# Patient Record
Sex: FEMALE | Race: BLACK OR AFRICAN AMERICAN | NOT HISPANIC OR LATINO | Employment: UNEMPLOYED | URBAN - METROPOLITAN AREA
[De-identification: names, ages, dates, MRNs, and addresses within clinical notes are randomized per-mention and may not be internally consistent; named-entity substitution may affect disease eponyms.]

---

## 2022-12-04 ENCOUNTER — OFFICE VISIT (OUTPATIENT)
Dept: URGENT CARE | Facility: CLINIC | Age: 22
End: 2022-12-04
Payer: COMMERCIAL

## 2022-12-04 VITALS
HEART RATE: 91 BPM | DIASTOLIC BLOOD PRESSURE: 88 MMHG | SYSTOLIC BLOOD PRESSURE: 148 MMHG | BODY MASS INDEX: 47.09 KG/M2 | OXYGEN SATURATION: 100 % | WEIGHT: 293 LBS | TEMPERATURE: 98 F | HEIGHT: 66 IN | RESPIRATION RATE: 18 BRPM

## 2022-12-04 DIAGNOSIS — S83.411A SPRAIN OF MEDIAL COLLATERAL LIGAMENT OF RIGHT KNEE, INITIAL ENCOUNTER: Primary | ICD-10-CM

## 2022-12-04 DIAGNOSIS — M25.569 KNEE PAIN, UNSPECIFIED CHRONICITY, UNSPECIFIED LATERALITY: ICD-10-CM

## 2022-12-04 PROCEDURE — 99203 PR OFFICE/OUTPT VISIT, NEW, LEVL III, 30-44 MIN: ICD-10-PCS | Mod: S$GLB,,,

## 2022-12-04 PROCEDURE — 73564 X-RAY EXAM KNEE 4 OR MORE: CPT | Mod: RT,S$GLB,, | Performed by: RADIOLOGY

## 2022-12-04 PROCEDURE — 99203 OFFICE O/P NEW LOW 30 MIN: CPT | Mod: S$GLB,,,

## 2022-12-04 PROCEDURE — 73564 XR KNEE COMP 4 OR MORE VIEWS RIGHT: ICD-10-PCS | Mod: RT,S$GLB,, | Performed by: RADIOLOGY

## 2022-12-04 RX ORDER — NAPROXEN 500 MG/1
500 TABLET ORAL 2 TIMES DAILY
Qty: 14 TABLET | Refills: 0 | Status: SHIPPED | OUTPATIENT
Start: 2022-12-04 | End: 2022-12-04

## 2022-12-04 RX ORDER — NAPROXEN 500 MG/1
500 TABLET ORAL 2 TIMES DAILY
Qty: 20 TABLET | Refills: 0 | Status: SHIPPED | OUTPATIENT
Start: 2022-12-04 | End: 2022-12-14

## 2022-12-04 NOTE — PATIENT INSTRUCTIONS
- Rest.    - Keep ace wrap on the knee at all times until pain has resolved.   - Rest the knee for the first 2 days and then slowly try to return to normal activity.     A referral for Orthopedics has been placed. Call [phone] to schedule an appointment.       - You must understand that you have received an Urgent Care treatment only and that you may be released before all of your medical problems are known or treated.   - You, the patient, will arrange for follow up care as instructed.   - If your condition worsens or fails to improve we recommend that you receive another evaluation at the ER immediately or contact your PCP to discuss your concerns or return here.   - Follow up with your PCP or specialty clinic as directed in the next 1-2 weeks if not improved or as needed.  You can call [phone] to schedule an appointment with the appropriate provider.    If your symptoms do not improve or worsen, go to the emergency room immediately.

## 2022-12-04 NOTE — PROGRESS NOTES
"Subjective:       Patient ID: Sneha Porter is a 22 y.o. female.    Vitals:  height is 5' 6" (1.676 m) and weight is 136.1 kg (300 lb). Her temperature is 98.2 °F (36.8 °C). Her blood pressure is 148/88 (abnormal) and her pulse is 91. Her respiration is 18 and oxygen saturation is 100%.     Chief Complaint: Knee Pain    Patient reports falling last night on a wood floor and felt a pop yesterday. She is unsure what caused her to fall. Patient can weight bear but it is extremely painful 8/10. Patient reports a stabbing pain when extending the leg or standing. No previous injury or surgery to the knee. Patient did take a naproxen with mild relief. She put ice on it last night. Denies tingling or numbness.     Knee Pain   The incident occurred 6 to 12 hours ago. The incident occurred in the street. The injury mechanism was a fall. The pain is present in the right knee. The pain is at a severity of 8/10. The pain is moderate. The pain has been Fluctuating since onset. Pertinent negatives include no inability to bear weight, numbness or tingling. She reports no foreign bodies present. Nothing aggravates the symptoms. She has tried rest for the symptoms. The treatment provided mild relief.     Constitution: Negative for sweating and fatigue.   HENT:  Negative for ear pain, congestion, sinus pain, sinus pressure and sore throat.    Eyes:  Negative for eye pain and eye redness.   Respiratory:  Negative for cough and sputum production.    Gastrointestinal:  Negative for vomiting.   Musculoskeletal:  Positive for pain, trauma, joint pain, joint swelling and abnormal ROM of joint.   Skin:  Negative for hives.   Allergic/Immunologic: Negative for hives and itching.   Neurological:  Negative for disorientation, altered mental status and numbness.   Psychiatric/Behavioral:  Negative for altered mental status and disorientation.      Objective:      Physical Exam   Constitutional: She is oriented to person, place, and time. She " appears well-developed. She is cooperative. No distress.   HENT:   Head: Normocephalic and atraumatic.   Nose: Nose normal.   Mouth/Throat: Oropharynx is clear and moist and mucous membranes are normal.   Eyes: Conjunctivae and lids are normal.   Neck: Trachea normal and phonation normal. Neck supple.   Cardiovascular: Normal rate, regular rhythm and normal pulses.   Pulmonary/Chest: Effort normal.   Abdominal: Normal appearance.   Musculoskeletal:         General: No deformity.      Right knee: She exhibits decreased range of motion, swelling and bony tenderness. She exhibits no effusion, no ecchymosis, no deformity, no laceration, no erythema, normal alignment, normal patellar mobility and normal meniscus. Tenderness found. Medial joint line, MCL and patellar tendon tenderness noted.        Legs:       Comments: Mild right knee swelling noted on exam when compared to the left. Skin is warm, dry and intact. No bruising or erythema noted. There is tenderness to the medial and inferior aspect of the knee. No pain with palpation over the patella. Unable to fully extend or flex the knee. Tenderness with palpation to the popliteal fossa.    Neurological: She is alert and oriented to person, place, and time. She has normal strength and normal reflexes. No sensory deficit.   Skin: Skin is warm, dry, intact and not diaphoretic. not right knee  Psychiatric: Her speech is normal and behavior is normal. Judgment and thought content normal.   Nursing note and vitals reviewed.    X-Ray Knee Complete 4 Or More Views Right    Result Date: 12/4/2022  EXAMINATION: XR KNEE COMP 4 OR MORE VIEWS RIGHT CLINICAL HISTORY: Pain in unspecified knee TECHNIQUE: Three views COMPARISON: None FINDINGS: No fractures.  No marrow replacement process.  Lateral subluxation of the patella noted bilaterally.  Joint spaces are fairly well maintained.     No acute findings. Electronically signed by: Brad Escamilla MD Date:    12/04/2022 Time:    11:55      Assessment:       1. Sprain of medial collateral ligament of right knee, initial encounter    2. Knee pain, unspecified chronicity, unspecified laterality          Plan:         Sprain of medial collateral ligament of right knee, initial encounter  -     Ambulatory referral/consult to Orthopedics  -     BANDAGE ELASTIC 6IN ACE  -     Discontinue: naproxen (NAPROSYN) 500 MG tablet; Take 1 tablet (500 mg total) by mouth 2 (two) times daily. for 7 days  Dispense: 14 tablet; Refill: 0  -     naproxen (NAPROSYN) 500 MG tablet; Take 1 tablet (500 mg total) by mouth 2 (two) times daily. for 10 days  Dispense: 20 tablet; Refill: 0  -     CRUTCHES FOR HOME USE    Knee pain, unspecified chronicity, unspecified laterality  -     Cancel: XR KNEE 3 VIEW RIGHT; Future; Expected date: 12/04/2022  -     X-Ray Knee Complete 4 Or More Views Right; Future; Expected date: 12/04/2022               Patient Instructions   - Rest.    - Keep ace wrap on the knee at all times until pain has resolved.   - Rest the knee for the first 2 days and then slowly try to return to normal activity.     A referral for Orthopedics has been placed. Call [phone] to schedule an appointment.       - You must understand that you have received an Urgent Care treatment only and that you may be released before all of your medical problems are known or treated.   - You, the patient, will arrange for follow up care as instructed.   - If your condition worsens or fails to improve we recommend that you receive another evaluation at the ER immediately or contact your PCP to discuss your concerns or return here.   - Follow up with your PCP or specialty clinic as directed in the next 1-2 weeks if not improved or as needed.  You can call [phone] to schedule an appointment with the appropriate provider.    If your symptoms do not improve or worsen, go to the emergency room immediately.